# Patient Record
Sex: MALE | Race: OTHER
[De-identification: names, ages, dates, MRNs, and addresses within clinical notes are randomized per-mention and may not be internally consistent; named-entity substitution may affect disease eponyms.]

---

## 2022-12-08 ENCOUNTER — HOSPITAL ENCOUNTER (INPATIENT)
Dept: HOSPITAL 93 - ER | Age: 32
LOS: 21 days | Discharge: HOME | DRG: 602 | End: 2022-12-29
Attending: INTERNAL MEDICINE | Admitting: INTERNAL MEDICINE
Payer: COMMERCIAL

## 2022-12-08 VITALS — WEIGHT: 105 LBS | BODY MASS INDEX: 16.88 KG/M2 | HEIGHT: 66 IN

## 2022-12-08 DIAGNOSIS — R41.82: ICD-10-CM

## 2022-12-08 DIAGNOSIS — L02.415: ICD-10-CM

## 2022-12-08 DIAGNOSIS — N17.8: ICD-10-CM

## 2022-12-08 DIAGNOSIS — E46: ICD-10-CM

## 2022-12-08 DIAGNOSIS — B20: ICD-10-CM

## 2022-12-08 DIAGNOSIS — I12.9: ICD-10-CM

## 2022-12-08 DIAGNOSIS — F19.20: ICD-10-CM

## 2022-12-08 DIAGNOSIS — C76.1: ICD-10-CM

## 2022-12-08 DIAGNOSIS — R13.19: ICD-10-CM

## 2022-12-08 DIAGNOSIS — E86.0: ICD-10-CM

## 2022-12-08 DIAGNOSIS — Z20.822: ICD-10-CM

## 2022-12-08 DIAGNOSIS — A41.9: ICD-10-CM

## 2022-12-08 DIAGNOSIS — N18.9: ICD-10-CM

## 2022-12-08 DIAGNOSIS — M60.051: ICD-10-CM

## 2022-12-08 DIAGNOSIS — B95.62: ICD-10-CM

## 2022-12-08 DIAGNOSIS — E03.9: ICD-10-CM

## 2022-12-08 DIAGNOSIS — L03.115: Primary | ICD-10-CM

## 2022-12-08 NOTE — NUR
PTE ALERTA Y ORIENTADO EN GILL ELEAZAR ESFERAS, LLEGA EN SILLA DE TAISHA, EN
COMPANIA DE FAMILIAR Y REFIERE ABSCESO EN PIERNA RT (MUSLO) HACE 5 GREENE
APROXIMADAMENTE. SE OBSERVA ERITEMA, AREA CALIENTE AL TACTO. SE UBICA EN AREA
DE OBSERVACION.

## 2022-12-09 PROCEDURE — BW24ZZZ COMPUTERIZED TOMOGRAPHY (CT SCAN) OF CHEST AND ABDOMEN: ICD-10-PCS | Performed by: INTERNAL MEDICINE

## 2022-12-09 NOTE — NUR
PACIENTE EVALUADO POR EL  QUIEN ORDENA TRATAMIENTO MEDICO. SE REALIZAN
MUESTRAS BAJO MEDIDAS ASEPTICAS Y ADMINISTRAN MEDICAMENTOS VÍCTOR ORDEN. SE HACE
ENTREGA DE ENVASE DE U/A. SE REALIZA CULTIVO DE ABCESO.

## 2022-12-09 NOTE — NUR
SE RECIBE PACIENTE EN AREA DE CRITICO, SE UBICA PACIENTE EN RIDDHI #1, SE
CONECTA A MONITOR CARDIACO Y OXIMETRIA DE PULSO, PACIENTE PREVIAMENTE
CANALIZADO EN BRAZO PRIMO, VENOPUNCION PATENTE, RICKY DE EDEMA Y ERITEMA,
BAJANDO 0.9% NSS A 150ML/HR. SE MANTIENE PACIENTE BAJO OBSERVACION POR CAMBIOS
EN TX MEDICO EN ESPERA DE CONSULTA CON DR WINSOME RUIZ.

## 2022-12-13 PROCEDURE — BQ3DZZZ MAGNETIC RESONANCE IMAGING (MRI) OF RIGHT LOWER LEG: ICD-10-PCS | Performed by: INTERNAL MEDICINE

## 2022-12-21 PROCEDURE — BQ2RZZZ COMPUTERIZED TOMOGRAPHY (CT SCAN) OF RIGHT LOWER EXTREMITY: ICD-10-PCS | Performed by: INTERNAL MEDICINE

## 2023-01-17 ENCOUNTER — HOSPITAL ENCOUNTER (EMERGENCY)
Dept: HOSPITAL 93 - ER | Age: 33
Discharge: LEFT BEFORE DISCHARGE | End: 2023-01-17
Payer: COMMERCIAL

## 2023-01-17 VITALS — BODY MASS INDEX: 14.07 KG/M2 | HEIGHT: 69 IN | WEIGHT: 95 LBS

## 2023-01-17 DIAGNOSIS — B20: ICD-10-CM

## 2023-01-17 DIAGNOSIS — Z20.822: ICD-10-CM

## 2023-01-17 DIAGNOSIS — L02.415: Primary | ICD-10-CM

## 2023-01-17 DIAGNOSIS — Z85.828: ICD-10-CM

## 2023-01-29 ENCOUNTER — HOSPITAL ENCOUNTER (INPATIENT)
Dept: HOSPITAL 93 - ER | Age: 33
LOS: 19 days | Discharge: HOME | DRG: 603 | End: 2023-02-17
Attending: INTERNAL MEDICINE | Admitting: INTERNAL MEDICINE
Payer: COMMERCIAL

## 2023-01-29 VITALS — HEIGHT: 60 IN | BODY MASS INDEX: 25.52 KG/M2 | WEIGHT: 130 LBS

## 2023-01-29 DIAGNOSIS — L03.115: Primary | ICD-10-CM

## 2023-01-29 DIAGNOSIS — B20: ICD-10-CM

## 2023-01-29 DIAGNOSIS — L02.415: ICD-10-CM

## 2023-01-29 DIAGNOSIS — N17.8: ICD-10-CM

## 2023-01-29 DIAGNOSIS — N18.9: ICD-10-CM

## 2023-01-29 DIAGNOSIS — I12.9: ICD-10-CM

## 2023-01-29 DIAGNOSIS — B96.89: ICD-10-CM

## 2023-01-29 DIAGNOSIS — E03.9: ICD-10-CM

## 2023-01-29 DIAGNOSIS — F19.20: ICD-10-CM

## 2023-01-29 PROCEDURE — BQ2RZZZ COMPUTERIZED TOMOGRAPHY (CT SCAN) OF RIGHT LOWER EXTREMITY: ICD-10-PCS | Performed by: INTERNAL MEDICINE

## 2023-01-29 PROCEDURE — 73725 MR ANG LWR EXT W OR W/O DYE: CPT

## 2023-01-29 PROCEDURE — 73206 CT ANGIO UPR EXTRM W/O&W/DYE: CPT

## 2023-01-31 PROCEDURE — BQ3DZZZ MAGNETIC RESONANCE IMAGING (MRI) OF RIGHT LOWER LEG: ICD-10-PCS | Performed by: INTERNAL MEDICINE

## 2023-02-03 PROCEDURE — 02HV33Z INSERTION OF INFUSION DEVICE INTO SUPERIOR VENA CAVA, PERCUTANEOUS APPROACH: ICD-10-PCS | Performed by: INTERNAL MEDICINE

## 2023-02-13 PROCEDURE — BQ2RYZZ COMPUTERIZED TOMOGRAPHY (CT SCAN) OF RIGHT LOWER EXTREMITY USING OTHER CONTRAST: ICD-10-PCS | Performed by: INTERNAL MEDICINE
